# Patient Record
Sex: MALE | Race: BLACK OR AFRICAN AMERICAN | Employment: FULL TIME | URBAN - METROPOLITAN AREA
[De-identification: names, ages, dates, MRNs, and addresses within clinical notes are randomized per-mention and may not be internally consistent; named-entity substitution may affect disease eponyms.]

---

## 2024-10-19 ENCOUNTER — HOSPITAL ENCOUNTER (EMERGENCY)
Age: 69
Discharge: HOME | End: 2024-10-19
Attending: EMERGENCY MEDICINE
Payer: COMMERCIAL

## 2024-10-19 VITALS
BODY MASS INDEX: 23.39 KG/M2 | OXYGEN SATURATION: 98 % | WEIGHT: 163.4 LBS | TEMPERATURE: 97.9 F | DIASTOLIC BLOOD PRESSURE: 70 MMHG | SYSTOLIC BLOOD PRESSURE: 131 MMHG | HEIGHT: 70 IN | HEART RATE: 98 BPM | RESPIRATION RATE: 21 BRPM

## 2024-10-19 DIAGNOSIS — N40.1 URINARY RETENTION DUE TO BENIGN PROSTATIC HYPERPLASIA: Primary | ICD-10-CM

## 2024-10-19 DIAGNOSIS — R33.8 URINARY RETENTION DUE TO BENIGN PROSTATIC HYPERPLASIA: Primary | ICD-10-CM

## 2024-10-19 LAB
BACTERIA URNS QL MICRO: ABNORMAL /HPF
BILIRUB UR QL STRIP.AUTO: NEGATIVE
CLARITY UR: CLEAR
COLOR UR: YELLOW
GLUCOSE UR STRIP.AUTO-MCNC: NEGATIVE MG/DL
HGB UR QL STRIP.AUTO: ABNORMAL
KETONES UR STRIP.AUTO-MCNC: NEGATIVE MG/DL
LEUKOCYTE ESTERASE UR QL STRIP.AUTO: ABNORMAL
NITRITE UR QL STRIP.AUTO: NEGATIVE
PH UR STRIP.AUTO: 7.5 [PH] (ref 5–8)
PROT UR STRIP.AUTO-MCNC: ABNORMAL MG/DL
RBC #/AREA URNS HPF: ABNORMAL /HPF (ref 0–4)
SP GR UR STRIP.AUTO: 1.02 (ref 1–1.03)
UA DIPSTICK W REFLEX MICRO PNL UR: YES
URN SPEC COLLECT METH UR: ABNORMAL
UROBILINOGEN UR STRIP-ACNC: 0.2 E.U./DL
WBC #/AREA URNS HPF: ABNORMAL /HPF (ref 0–5)

## 2024-10-19 PROCEDURE — 81001 URINALYSIS AUTO W/SCOPE: CPT

## 2024-10-19 PROCEDURE — 51702 INSERT TEMP BLADDER CATH: CPT

## 2024-10-19 PROCEDURE — 51798 US URINE CAPACITY MEASURE: CPT

## 2024-10-19 PROCEDURE — 99283 EMERGENCY DEPT VISIT LOW MDM: CPT

## 2024-10-19 PROCEDURE — 6370000000 HC RX 637 (ALT 250 FOR IP): Performed by: EMERGENCY MEDICINE

## 2024-10-19 RX ORDER — TAMSULOSIN HYDROCHLORIDE 0.4 MG/1
0.4 CAPSULE ORAL DAILY
Qty: 30 CAPSULE | Refills: 0 | Status: SHIPPED | OUTPATIENT
Start: 2024-10-19

## 2024-10-19 RX ORDER — TAMSULOSIN HYDROCHLORIDE 0.4 MG/1
0.4 CAPSULE ORAL ONCE
Status: COMPLETED | OUTPATIENT
Start: 2024-10-19 | End: 2024-10-19

## 2024-10-19 RX ADMIN — TAMSULOSIN HYDROCHLORIDE 0.4 MG: 0.4 CAPSULE ORAL at 09:06

## 2024-10-19 ASSESSMENT — ENCOUNTER SYMPTOMS
VOMITING: 0
WHEEZING: 0
BACK PAIN: 0
NAUSEA: 0
DIARRHEA: 0
ABDOMINAL PAIN: 1
COLOR CHANGE: 0
COUGH: 0
CONSTIPATION: 0
SHORTNESS OF BREATH: 0

## 2024-10-19 ASSESSMENT — LIFESTYLE VARIABLES
HOW MANY STANDARD DRINKS CONTAINING ALCOHOL DO YOU HAVE ON A TYPICAL DAY: PATIENT DOES NOT DRINK
HOW OFTEN DO YOU HAVE A DRINK CONTAINING ALCOHOL: NEVER

## 2024-10-19 NOTE — DISCHARGE INSTRUCTIONS
Return to the emergency department if continue to have urinary retention despite medical management.

## 2024-10-19 NOTE — ED PROVIDER NOTES
THE [facility]  EMERGENCY DEPARTMENT ENCOUNTER          ATTENDING PHYSICIAN NOTE       Date of evaluation: 10/19/2024    Chief Complaint     Urinary Retention (Patient with chief complaint of urinary retention/trouble urinating that started this morning around 0200. )      History of Present Illness     Fred Hurtado is a 69 y.o. male with past medical history of BPH in ED with complaint of urinary retention.  Patient states that he acutely was unable to urinate this morning after taking Vivienne-[place] yesterday.  Patient does have known history of BPH and had discussions regarding surgical resection with his urologist 13 months ago.  At that time patient denied Flomax and finasteride due to concerns for impotence.  Here endorses abdominal discomfort related to urinary retention.  Denies nausea, vomiting, fevers, chills, chest pain, shortness of breath, flank pain, blood in his urine or stool.    ASSESSMENT / PLAN  (MEDICAL DECISION MAKING)     INITIAL VITALS: BP: (!) 182/111, Temp: 97.9 °F (36.6 °C), Pulse: (!) 113, Respirations: 21, SpO2: 100 %      Fred Hurtado is a 69 y.o. male in ED with urinary retention.  On my examination he is in no acute distress.  His vital signs are stable.  He is documented as being tachycardic however on my examination he has normal heart rate.  He is well-appearing.  Abdomen is soft and nontender.  He has no CVA tenderness.    Patient straight cath here with relief of his symptoms.  Urinalysis without evidence of UTI.  Patient states that he would like to avoid placement of a Vásquez catheter and willing to trial oral medications.  He states that he has good follow-up with his urologist.  Will provide dose of Flomax here and outpatient prescription.  Patient understands that if he continues to have urinary retention he would need to return to the emergency department for placement of a Vásquez catheter and a trial of void as an outpatient.  Provided strict return to ED criteria.     Constitutional:       General: He is not in acute distress.     Appearance: He is well-developed. He is not diaphoretic.   HENT:      Head: Normocephalic and atraumatic.   Cardiovascular:      Rate and Rhythm: Normal rate and regular rhythm.      Heart sounds: Normal heart sounds.   Pulmonary:      Effort: Pulmonary effort is normal. No respiratory distress.      Breath sounds: Normal breath sounds. No stridor. No wheezing or rales.   Abdominal:      General: Bowel sounds are normal. There is no distension.      Palpations: Abdomen is soft.      Tenderness: There is no abdominal tenderness. There is no guarding or rebound.   Musculoskeletal:         General: No deformity. Normal range of motion.      Cervical back: Normal range of motion.   Skin:     General: Skin is warm and dry.   Neurological:      Mental Status: He is alert and oriented to person, place, and time.      Cranial Nerves: No cranial nerve deficit.                  Radames Gan MD  10/19/24 [id number]

## 2024-10-19 NOTE — ED NOTES
Pt verbalizes  understanding of discharge instructions. Pt discharged with copy of AVS.     Jack Up RN  10/19/24 0962

## 2024-10-19 NOTE — ED NOTES
Pt verbalizes understanding of discharge instructions including indwelling urinary  catheter care and follow up instructions. Pt discharged with copy of AVS.      Jack Up RN  10/19/24 6472

## 2024-11-01 ENCOUNTER — HOSPITAL ENCOUNTER (EMERGENCY)
Age: 69
Discharge: HOME | End: 2024-11-01
Attending: EMERGENCY MEDICINE
Payer: COMMERCIAL

## 2024-11-01 VITALS
TEMPERATURE: 98.2 F | DIASTOLIC BLOOD PRESSURE: 89 MMHG | WEIGHT: 160.8 LBS | OXYGEN SATURATION: 98 % | BODY MASS INDEX: 23.07 KG/M2 | SYSTOLIC BLOOD PRESSURE: 149 MMHG | RESPIRATION RATE: 15 BRPM | HEART RATE: 112 BPM

## 2024-11-01 DIAGNOSIS — N40.1 URINARY RETENTION DUE TO BENIGN PROSTATIC HYPERPLASIA: Primary | ICD-10-CM

## 2024-11-01 DIAGNOSIS — R33.8 URINARY RETENTION DUE TO BENIGN PROSTATIC HYPERPLASIA: Primary | ICD-10-CM

## 2024-11-01 LAB
BACTERIA URNS QL MICRO: ABNORMAL /HPF
BILIRUB UR QL STRIP.AUTO: NEGATIVE
CLARITY UR: CLEAR
COLOR UR: YELLOW
GLUCOSE UR STRIP.AUTO-MCNC: NEGATIVE MG/DL
HGB UR QL STRIP.AUTO: ABNORMAL
KETONES UR STRIP.AUTO-MCNC: NEGATIVE MG/DL
LEUKOCYTE ESTERASE UR QL STRIP.AUTO: NEGATIVE
MUCOUS THREADS #/AREA URNS LPF: ABNORMAL /LPF
NITRITE UR QL STRIP.AUTO: NEGATIVE
PH UR STRIP.AUTO: 7.5 [PH] (ref 5–8)
PROT UR STRIP.AUTO-MCNC: ABNORMAL MG/DL
RBC #/AREA URNS HPF: ABNORMAL /HPF (ref 0–4)
SP GR UR STRIP.AUTO: 1.02 (ref 1–1.03)
UA COMPLETE W REFLEX CULTURE PNL UR: ABNORMAL
UA DIPSTICK W REFLEX MICRO PNL UR: YES
URN SPEC COLLECT METH UR: ABNORMAL
UROBILINOGEN UR STRIP-ACNC: 0.2 E.U./DL
WBC #/AREA URNS HPF: ABNORMAL /HPF (ref 0–5)

## 2024-11-01 PROCEDURE — 81001 URINALYSIS AUTO W/SCOPE: CPT

## 2024-11-01 PROCEDURE — 99283 EMERGENCY DEPT VISIT LOW MDM: CPT

## 2024-11-01 PROCEDURE — 51702 INSERT TEMP BLADDER CATH: CPT

## 2024-11-01 NOTE — ED PROVIDER NOTES
*Care was provided during a period of multiple hospital medication shortages, including a nationwide IV fluid shortage due to natural disasters*    THE [facility]  EMERGENCY DEPARTMENT ENCOUNTER          ATTENDING PHYSICIAN NOTE       Date of evaluation: 11/1/2024    Chief Complaint     Urinary Retention (Was here last week for the same thing, went to urologist last week and removed catheter, needs prostate removed, hasn't urinated fully since 2130, has dribbled since then, )      History of Present Illness     Fred Hurtado is a 69 y.o. male who presents the emergency department today with urinary retention.  Has a known history of BPH, was seen here on 10/19 and had a Burnette catheter placed at that time due to similar urinary retention.  Had it removed on 10/22 and was doing well since that time until this evening when he began to have difficulty urinating again and now has been completely unable to void.  He has severe lower abdominal pain due to this.  Denies fevers, has some dysuria which is quite common for him with the symptoms.  Has been taking tamsulosin as prescribed.    Physical Exam     INITIAL VITALS: BP: (!) 180/107 (before burnette, complaint urinary retention), Temp: 98.2 °F (36.8 °C), Pulse: (!) 112, Respirations: 15, SpO2: 97 %     Physical Exam    Nursing note and vitals reviewed.    General:  Adult male, alert and appropriately interactive. In mild distress from discomfort  HENT: Normocephalic and atraumatic. External ears normal. Nose appears normal externally.  Eyes: Conjunctivae normal. No scleral icterus.   Neck: Neck supple. No tracheal deviation present.   CV: Normal rate. Regular rhythm.   Pulm: Effort normal on RA.   GI: Soft. No distension. Suprapubic tenderness. No rebound or guarding. No masses. No peritoneal signs.    Musculoskeletal: No edema. No gross deformities.  Neurological: Alert and appropriately interactive. Face symmetric, speech without dysarthria or obvious aphasia. Moving  capsule by mouth daily, Disp-30 capsule, R-0Normal             Allergies     He has No Known Allergies.                    Jose Daniel Vela MD  11/01/24 [id number]

## 2024-11-01 NOTE — DISCHARGE INSTRUCTIONS
You were seen in the emergency department for difficulty urinating.  You had a catheter placed again to help drain your bladder.  Please follow-up with your urologist to discuss the timing of the removal of this catheter and any possible future procedures that may help you urinate more easily.  Continue the tamsulosin you have been prescribed.    Call 911 or go to the nearest Emergency Department immediately for worsening symptoms, difficulty breathing, chest pain, lightheadedness, difficulty moving or talking, sensory loss, difficulty controlling your bowel or bladder function, altered level of consciousness, neck stiffness, uncontrollable nausea or vomiting, uncontrollable pain, inability to tolerate oral intake, fever, chills, severe bleeding, signs of infection (spreading redness, area feels very warm, swelling, pain, foul-smelling drainage), thoughts of harming yourself or others, or any other concerns.    If we have prescribed you any new medications, please take them as prescribed and read the drug package insert carefully.  Do not drink alcohol, drive, or operate machinery if you are taking narcotic pain medications.

## 2024-11-08 ENCOUNTER — HOSPITAL ENCOUNTER (EMERGENCY)
Age: 69
Discharge: HOME | End: 2024-11-08
Attending: EMERGENCY MEDICINE
Payer: COMMERCIAL

## 2024-11-08 ENCOUNTER — APPOINTMENT (OUTPATIENT)
Dept: CT IMAGING | Age: 69
End: 2024-11-08
Payer: COMMERCIAL

## 2024-11-08 VITALS
TEMPERATURE: 98 F | HEART RATE: 88 BPM | RESPIRATION RATE: 16 BRPM | DIASTOLIC BLOOD PRESSURE: 81 MMHG | OXYGEN SATURATION: 94 % | WEIGHT: 145 LBS | HEIGHT: 70 IN | SYSTOLIC BLOOD PRESSURE: 134 MMHG | BODY MASS INDEX: 20.76 KG/M2

## 2024-11-08 VITALS
HEART RATE: 71 BPM | DIASTOLIC BLOOD PRESSURE: 89 MMHG | BODY MASS INDEX: 23.34 KG/M2 | SYSTOLIC BLOOD PRESSURE: 154 MMHG | TEMPERATURE: 97.8 F | WEIGHT: 163 LBS | RESPIRATION RATE: 16 BRPM | OXYGEN SATURATION: 100 % | HEIGHT: 70 IN

## 2024-11-08 DIAGNOSIS — R39.9 COMPLAINT ABOUT URINARY CATHETER: Primary | ICD-10-CM

## 2024-11-08 DIAGNOSIS — N40.0 PROSTATIC HYPERTROPHY: Primary | ICD-10-CM

## 2024-11-08 DIAGNOSIS — R33.9 URINARY RETENTION: ICD-10-CM

## 2024-11-08 DIAGNOSIS — Z97.8 COMPLAINT ABOUT URINARY CATHETER: Primary | ICD-10-CM

## 2024-11-08 LAB
ANION GAP SERPL CALCULATED.3IONS-SCNC: 8 MMOL/L (ref 3–16)
BASOPHILS # BLD: 0.1 K/UL (ref 0–0.2)
BASOPHILS NFR BLD: 0.9 %
BUN SERPL-MCNC: 16 MG/DL (ref 7–20)
CALCIUM SERPL-MCNC: 9.1 MG/DL (ref 8.3–10.6)
CHLORIDE SERPL-SCNC: 108 MMOL/L (ref 99–110)
CO2 SERPL-SCNC: 27 MMOL/L (ref 21–32)
CREAT SERPL-MCNC: 1.5 MG/DL (ref 0.8–1.3)
DEPRECATED RDW RBC AUTO: 14.1 % (ref 12.4–15.4)
EOSINOPHIL # BLD: 0.1 K/UL (ref 0–0.6)
EOSINOPHIL NFR BLD: 1.2 %
GFR SERPLBLD CREATININE-BSD FMLA CKD-EPI: 50 ML/MIN/{1.73_M2}
GLUCOSE SERPL-MCNC: 108 MG/DL (ref 70–99)
HCT VFR BLD AUTO: 40.9 % (ref 40.5–52.5)
HGB BLD-MCNC: 13.8 G/DL (ref 13.5–17.5)
INR PPP: 1.04 (ref 0.85–1.15)
LYMPHOCYTES # BLD: 1.1 K/UL (ref 1–5.1)
LYMPHOCYTES NFR BLD: 19.4 %
MCH RBC QN AUTO: 31.4 PG (ref 26–34)
MCHC RBC AUTO-ENTMCNC: 33.7 G/DL (ref 31–36)
MCV RBC AUTO: 93.2 FL (ref 80–100)
MONOCYTES # BLD: 0.8 K/UL (ref 0–1.3)
MONOCYTES NFR BLD: 14.1 %
NEUTROPHILS # BLD: 3.8 K/UL (ref 1.7–7.7)
NEUTROPHILS NFR BLD: 64.4 %
PLATELET # BLD AUTO: 213 K/UL (ref 135–450)
PMV BLD AUTO: 8.4 FL (ref 5–10.5)
POTASSIUM SERPL-SCNC: 4.5 MMOL/L (ref 3.5–5.1)
PROTHROMBIN TIME: 13.8 SEC (ref 11.9–14.9)
RBC # BLD AUTO: 4.39 M/UL (ref 4.2–5.9)
SODIUM SERPL-SCNC: 143 MMOL/L (ref 136–145)
WBC # BLD AUTO: 5.9 K/UL (ref 4–11)

## 2024-11-08 PROCEDURE — 6370000000 HC RX 637 (ALT 250 FOR IP): Performed by: EMERGENCY MEDICINE

## 2024-11-08 PROCEDURE — 99283 EMERGENCY DEPT VISIT LOW MDM: CPT

## 2024-11-08 PROCEDURE — 51702 INSERT TEMP BLADDER CATH: CPT

## 2024-11-08 PROCEDURE — 85610 PROTHROMBIN TIME: CPT

## 2024-11-08 PROCEDURE — 74176 CT ABD & PELVIS W/O CONTRAST: CPT

## 2024-11-08 PROCEDURE — 99284 EMERGENCY DEPT VISIT MOD MDM: CPT

## 2024-11-08 PROCEDURE — 80048 BASIC METABOLIC PNL TOTAL CA: CPT

## 2024-11-08 PROCEDURE — 51798 US URINE CAPACITY MEASURE: CPT

## 2024-11-08 PROCEDURE — 85025 COMPLETE CBC W/AUTO DIFF WBC: CPT

## 2024-11-08 RX ORDER — OXYBUTYNIN CHLORIDE 5 MG/1
5 TABLET, EXTENDED RELEASE ORAL DAILY
Qty: 30 TABLET | Refills: 3 | Status: SHIPPED | OUTPATIENT
Start: 2024-11-08

## 2024-11-08 RX ORDER — IBUPROFEN 400 MG/1
800 TABLET, FILM COATED ORAL ONCE
Status: COMPLETED | OUTPATIENT
Start: 2024-11-08 | End: 2024-11-08

## 2024-11-08 RX ORDER — TAMSULOSIN HYDROCHLORIDE 0.4 MG/1
0.4 CAPSULE ORAL DAILY
Qty: 30 CAPSULE | Refills: 0 | Status: SHIPPED | OUTPATIENT
Start: 2024-11-08

## 2024-11-08 RX ORDER — OXYBUTYNIN CHLORIDE 5 MG/1
5 TABLET, EXTENDED RELEASE ORAL DAILY
Qty: 30 TABLET | Refills: 3 | Status: SHIPPED | OUTPATIENT
Start: 2024-11-08 | End: 2024-11-08

## 2024-11-08 RX ADMIN — IBUPROFEN 800 MG: 400 TABLET, FILM COATED ORAL at 08:17

## 2024-11-08 ASSESSMENT — PAIN SCALES - GENERAL: PAINLEVEL_OUTOF10: 0

## 2024-11-08 ASSESSMENT — LIFESTYLE VARIABLES
HOW OFTEN DO YOU HAVE A DRINK CONTAINING ALCOHOL: NEVER
HOW MANY STANDARD DRINKS CONTAINING ALCOHOL DO YOU HAVE ON A TYPICAL DAY: PATIENT DOES NOT DRINK

## 2024-11-08 NOTE — ED PROVIDER NOTES
THE [facility]  EMERGENCY DEPARTMENT ENCOUNTER          ATTENDING PHYSICIAN NOTE       Date of evaluation: 11/8/2024    Chief Complaint     Urinary Catheter (Pt states he had burnette placed this morning around 8 am, since then has had very little urinary output. Pt states he has been voiding around the burnette and noted blood in urine. Hx of BPH, states he is supposed to meet with surgeon next week. Denies pain )      History of Present Illness     Fred Hurtado is a 69 y.o. male who presents because he says Burnette is not draining into the bag and is draining around his catheter.  \"I have the largest catheter you gutricia could find more I is here few hours ago.\"  He was seen October 19 for urinary retention.  A Burnette catheter was placed and patient was discharged with urology follow-up.  He was seen on October 23 they tried to place sterile water into the bladder via the catheter but he had severe pain/spasms and leaking around the catheter.  They tried a second time same thing happened.  They remove the catheter and patient was able to void.  On November 1 she was seen for urinary retention.  Was seen in the emergency department where a catheter was placed and they noticed a long thin clot was dislodged with the placement.  He was seen at outpatient urology at [facility] yesterday with a started teaching had a straight cath that began to bleed and he had pain so they put a catheter back in him yesterday. patient returned in AM today because of urine leaking around the catheter.  Catheter was placed again.  He is got a 20 [place] in right now  ASSESSMENT / PLAN  (MEDICAL DECISION MAKING)     INITIAL VITALS: BP: (!) 145/92, Temp: 98 °F (36.7 °C), Pulse: 90, Respirations: 16, SpO2: 98 %      Fred Hurtado is a 69 y.o. male who presents because he says Burnette is not draining into the bag and is draining around his catheter.  \"I have the largest catheter you gutricia could find more I is here few hours ago.\"  He was seen  Glom Filt Rate 50 (A) >60    Calcium 9.1 8.3 - 10.6 mg/dL   Protime-INR   Result Value Ref Range    Protime 13.8 11.9 - 14.9 sec    INR 1.04 0.85 - 1.15     EKG       ED BEDSIDE ULTRASOUND:  No results found.    MOST RECENT VITALS:  BP: (!) 145/92,Temp: 98 °F (36.7 °C), Pulse: 90, Respirations: 16, SpO2: 98 %     Procedures         ED Course     Nursing Notes, Past Medical Hx, Past Surgical Hx, Social Hx,Allergies, and Family Hx were reviewed.         The patient was given the following medications:  Orders Placed This Encounter   Medications    DISCONTD: oxyBUTYnin (DITROPAN XL) 5 MG extended release tablet     Sig: Take 1 tablet by mouth daily     Dispense:  30 tablet     Refill:  3    oxyBUTYnin (DITROPAN XL) 5 MG extended release tablet     Sig: Take 1 tablet by mouth daily     Dispense:  30 tablet     Refill:  3       CONSULTS:  IP CONSULT TO UROLOGY  IP CONSULT TO UROLOGY    Review of Systems     Review of Systems    Past Medical, Surgical, Family, and Social History     He has no past medical history on file.  He has no past surgical history on file.  His family history is not on file.  He reports that he has never smoked. He has never used smokeless tobacco. He reports that he does not drink alcohol and does not use drugs.    Medications     Current Discharge Medication List        CONTINUE these medications which have NOT CHANGED    Details   tamsulosin (FLOMAX) 0.4 MG capsule Take 1 capsule by mouth daily  Qty: 30 capsule, Refills: 0             Allergies     He has No Known Allergies.    Physical Exam     INITIAL VITALS: BP: (!) 145/92, Temp: 98 °F (36.7 °C), Pulse: 90, Respirations: 16, SpO2: 98 %   Physical Exam  Vitals and nursing note reviewed.   Constitutional:       General: He is not in acute distress.     Appearance: He is well-developed.   HENT:      Head: Normocephalic.   Cardiovascular:      Rate and Rhythm: Normal rate.   Abdominal:      Palpations: Abdomen is soft.      Tenderness: There is

## 2024-11-08 NOTE — ED PROVIDER NOTES
THE [facility]  EMERGENCY DEPARTMENT ENCOUNTER          ATTENDING PHYSICIAN NOTE       Date of evaluation: 11/8/2024    Chief Complaint     Catheter issues      History of Present Illness     Fred Hurtado is a 69 y.o. male with a history of prostate problems who presents with complaints of urine leaking from around his catheter that was placed yesterday.  The patient had a trial of voiding and it lasted only an hour before they placed another Vásquez catheter.  The patient states that there was bleeding at that time and there was some resistance but the catheter went in.  He is concerned because there is less urine in the bag and more urine draining out from around the catheter causing his pants to get wet.  He reports minimal if any pain at this time.  He states he is supposed to be seeing someone from the urology group because he is not going to continue with his current urologist.    Review of Systems     The patient denies fever or chills, nausea or vomiting.  See HPI for further details. Review of systems otherwise negative.    Past Medical, Surgical, Family, and Social History     He has no past medical history on file.  He has no past surgical history on file.  His family history is not on file.  He reports that he has never smoked. He has never used smokeless tobacco. He reports that he does not drink alcohol and does not use drugs.    Medications     Discharge Medication List as of 11/8/2024  8:12 AM          Allergies     He has No Known Allergies.    Physical Exam     INITIAL VITALS: BP: (!) 154/89, Temp: 97.8 °F (36.6 °C), Pulse: 71, Respirations: 16, SpO2: 100 %   Constitutional:  Well developed, well nourished, no acute distress, non-toxic appearance   GI:  Abdomen soft, nontender, and non-distended  :  No flank/CVA tenderness, no blood at the urethral meatus, and no current urine leaking that is visible  Integument:  Well hydrated     Diagnostic Results     LABS:   No results found for this

## 2024-11-08 NOTE — ED TRIAGE NOTES
Pt states he had burnette placed this morning around 8 am, since then has had very little urinary output. Pt states he has been voiding around the burnette and noted blood in urine. Hx of BPH, states he is supposed to meet with surgeon next week. Denies pain

## 2024-11-08 NOTE — PROGRESS NOTES
Irrigation done in his burnette catheter but all the irrigation comes outside the catheter by the urethra. Flush is easy on the catheter, there is no pressure. Irrigation stopped, doctor notified, CT ordered.

## 2024-11-09 NOTE — ED NOTES
Pt discharged to home, alert and oriented. Denies any questions about discharge instructions. Will follow up as directed. encouraged to return for any worsening symptoms.        Yvette Carr RN  11/08/24 0504

## 2024-11-09 NOTE — DISCHARGE INSTRUCTIONS
Drink plenty of fluids as we discussed.  Use the medication prescribed to help with bladder spasm.